# Patient Record
Sex: FEMALE | URBAN - METROPOLITAN AREA
[De-identification: names, ages, dates, MRNs, and addresses within clinical notes are randomized per-mention and may not be internally consistent; named-entity substitution may affect disease eponyms.]

---

## 2022-12-21 ENCOUNTER — OFFICE VISIT (OUTPATIENT)
Dept: URBAN - METROPOLITAN AREA CLINIC 6 | Facility: CLINIC | Age: 77
End: 2022-12-21
Payer: MEDICARE

## 2022-12-21 DIAGNOSIS — B02.29 OTHER POSTHERPETIC NERVOUS SYSTEM INVOLVEMENT: Primary | ICD-10-CM

## 2022-12-21 PROCEDURE — 92002 INTRM OPH EXAM NEW PATIENT: CPT | Performed by: OPTOMETRIST

## 2022-12-21 RX ORDER — METHYLPREDNISOLONE 4 MG/1
4 MG TABLET ORAL
Qty: 21 | Refills: 0 | Status: ACTIVE
Start: 2022-12-21

## 2022-12-21 RX ORDER — GANCICLOVIR 1.5 MG/G
0.15 % GEL OPHTHALMIC
Qty: 5 | Refills: 0 | Status: ACTIVE
Start: 2022-12-21

## 2022-12-21 ASSESSMENT — INTRAOCULAR PRESSURE
OS: 8
OD: 8

## 2022-12-21 NOTE — IMPRESSION/PLAN
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 Given corneal involvement, start Zirgan 0.15% gel to the affected eye and skin five times a day. Stressed the importance of contact precautions.  RTC 1-2 weeks for a baseline dilated exam.

## 2023-01-04 ENCOUNTER — OFFICE VISIT (OUTPATIENT)
Dept: URBAN - METROPOLITAN AREA CLINIC 6 | Facility: CLINIC | Age: 78
End: 2023-01-04
Payer: MEDICARE

## 2023-01-04 DIAGNOSIS — B02.39 OTHER HERPES ZOSTER EYE DISEASE: Primary | ICD-10-CM

## 2023-01-04 PROCEDURE — 92012 INTRM OPH EXAM EST PATIENT: CPT | Performed by: OPTOMETRIST

## 2023-01-04 RX ORDER — NEOMYCIN SULFATE, POLYMYXIN B SULFATE AND DEXAMETHASONE 3.5; 10000; 1 MG/ML; [USP'U]/ML; MG/ML
SUSPENSION OPHTHALMIC
Qty: 5 | Refills: 0 | Status: ACTIVE
Start: 2023-01-04

## 2023-01-04 ASSESSMENT — INTRAOCULAR PRESSURE
OS: 7
OD: 8

## 2023-01-04 NOTE — IMPRESSION/PLAN
Impression: Other herpes zoster eye disease: B02.39. Plan: Follow up visit for HZV Dermatitis - No Keratitis at this point in time only positive staining of prior corneal refractive surgery in atypical pattern (arcuate markings in circular pattern). No cells/flare and no SPK. Patient is a very unclear and poor historian stating she was seen at [facility] for Shingles (date of admission unclear). She states the treatment they provided worsened her symptoms. She has seen PCP (date of care unclear) and was educated that the treatment done at [facility] was not appropriate, as per patient. Today, she had several questions on how to use the Zirgan 0.15% gel. Patient was instructed to apply to the adnexa 5x/day (written on instructions for pharmacy on 12/21/22), but did not remember these instructions and called [facility] asking how to use the medication. In the formulary, in our records, it shows an additional Rx sent on 12/22/22 with alternative instructions stating to apply into the lower conjunctival sac. Due to these instructions, as per the patient, the pharmacist then instructed her to apply directly from the tube onto the lower inner rim of the eyelid in a sweeping motion. Educated patient today that the medication becomes contaminated by applying in such a way and a new Rx will be written today. Reiterated to apply using a Q-tip to the skin of the adnexa due to the dermatitis affecting RUL/eyelash margin AND may be applied to the rest of the dermatitis/lesions using a Q-tip NOT straight from the tube. Patient was redirected towards her PCP for her several questions and concerns for her painful herpetic neuralgia and painful dermatitis affecting scalp/forehead/temple. Reiterated to patient several times the need to be seen by PCP for further treatment and that it was outside my scope of practice. Today's instructions for patient: New paper Rx written for Alan Baba 0.15% gel to the affected adnexa and skin 3 times a day (was using 5 times a day prior). Also, paper Rx given for Maxitrol drops QID OD until next visit. Patient states her eye hurts her and is watery/painful- due to positive staining of past refractive surgery markings will Rx drop with antibiotic coverage, as well. Stressed the importance of contact precautions. Patient requested handwritten Rx she can take it to [facility]. NOTE: Patient was advised to return in 2 days, however, upon check out she requested to be seen next week. Educated on the need to recheck the cornea sooner but patient still deferred her appointment for a later date.